# Patient Record
Sex: FEMALE | Race: WHITE | NOT HISPANIC OR LATINO
[De-identification: names, ages, dates, MRNs, and addresses within clinical notes are randomized per-mention and may not be internally consistent; named-entity substitution may affect disease eponyms.]

---

## 2021-01-01 ENCOUNTER — EXTERNAL RECORD (OUTPATIENT)
Dept: HEALTH INFORMATION MANAGEMENT | Facility: OTHER | Age: 66
End: 2021-01-01

## 2021-02-04 ENCOUNTER — IMMUNIZATION (OUTPATIENT)
Dept: LAB | Age: 66
End: 2021-02-04

## 2021-02-04 DIAGNOSIS — Z23 NEED FOR VACCINATION: Primary | ICD-10-CM

## 2021-02-04 PROCEDURE — 0001A COVID 19 PFIZER-BIONTECH: CPT

## 2021-02-04 PROCEDURE — 91300 COVID 19 PFIZER-BIONTECH: CPT

## 2021-03-04 ENCOUNTER — IMMUNIZATION (OUTPATIENT)
Dept: LAB | Age: 66
End: 2021-03-04

## 2021-03-04 DIAGNOSIS — Z23 NEED FOR VACCINATION: Primary | ICD-10-CM

## 2021-03-04 PROCEDURE — 0002A COVID 19 PFIZER-BIONTECH: CPT

## 2021-03-04 PROCEDURE — 91300 COVID 19 PFIZER-BIONTECH: CPT

## 2021-05-08 ENCOUNTER — HOSPITAL ENCOUNTER (INPATIENT)
Facility: HOSPITAL | Age: 66
LOS: 5 days | Discharge: HOME HEALTH | DRG: 481 | End: 2021-05-13
Attending: EMERGENCY MEDICINE | Admitting: HOSPITALIST
Payer: MEDICARE

## 2021-05-08 ENCOUNTER — APPOINTMENT (OUTPATIENT)
Dept: GENERAL RADIOLOGY | Facility: HOSPITAL | Age: 66
DRG: 481 | End: 2021-05-08
Attending: EMERGENCY MEDICINE
Payer: MEDICARE

## 2021-05-08 DIAGNOSIS — S00.81XA ABRASION OF FACE, INITIAL ENCOUNTER: ICD-10-CM

## 2021-05-08 DIAGNOSIS — S72.401A CLOSED FRACTURE OF DISTAL END OF RIGHT FEMUR, UNSPECIFIED FRACTURE MORPHOLOGY, INITIAL ENCOUNTER (HCC): Primary | ICD-10-CM
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
## 2021-05-08 PROCEDURE — 99223 1ST HOSP IP/OBS HIGH 75: CPT | Performed by: INTERNAL MEDICINE

## 2021-05-08 PROCEDURE — 73560 X-RAY EXAM OF KNEE 1 OR 2: CPT | Performed by: EMERGENCY MEDICINE

## 2021-05-08 RX ORDER — MORPHINE SULFATE 4 MG/ML
4 INJECTION, SOLUTION INTRAMUSCULAR; INTRAVENOUS ONCE
Status: COMPLETED | OUTPATIENT
Start: 2021-05-08 | End: 2021-05-08

## 2021-05-08 RX ORDER — HEPARIN SODIUM 5000 [USP'U]/ML
5000 INJECTION, SOLUTION INTRAVENOUS; SUBCUTANEOUS EVERY 8 HOURS SCHEDULED
Status: COMPLETED | OUTPATIENT
Start: 2021-05-08 | End: 2021-05-09

## 2021-05-08 RX ORDER — ACETAMINOPHEN 325 MG/1
650 TABLET ORAL EVERY 4 HOURS PRN
Status: DISCONTINUED | OUTPATIENT
Start: 2021-05-08 | End: 2021-05-13

## 2021-05-08 RX ORDER — SODIUM CHLORIDE, SODIUM LACTATE, POTASSIUM CHLORIDE, CALCIUM CHLORIDE 600; 310; 30; 20 MG/100ML; MG/100ML; MG/100ML; MG/100ML
INJECTION, SOLUTION INTRAVENOUS CONTINUOUS
Status: DISCONTINUED | OUTPATIENT
Start: 2021-05-08 | End: 2021-05-11

## 2021-05-08 RX ORDER — MORPHINE SULFATE 2 MG/ML
2 INJECTION, SOLUTION INTRAMUSCULAR; INTRAVENOUS EVERY 2 HOUR PRN
Status: DISCONTINUED | OUTPATIENT
Start: 2021-05-08 | End: 2021-05-13

## 2021-05-08 RX ORDER — HYDROCODONE BITARTRATE AND ACETAMINOPHEN 5; 325 MG/1; MG/1
1 TABLET ORAL EVERY 4 HOURS PRN
Status: DISCONTINUED | OUTPATIENT
Start: 2021-05-08 | End: 2021-05-13

## 2021-05-08 RX ORDER — HYDROCODONE BITARTRATE AND ACETAMINOPHEN 5; 325 MG/1; MG/1
2 TABLET ORAL EVERY 4 HOURS PRN
Status: DISCONTINUED | OUTPATIENT
Start: 2021-05-08 | End: 2021-05-13

## 2021-05-08 RX ORDER — ONDANSETRON 2 MG/ML
INJECTION INTRAMUSCULAR; INTRAVENOUS
Status: COMPLETED
Start: 2021-05-08 | End: 2021-05-08

## 2021-05-08 RX ORDER — MORPHINE SULFATE 2 MG/ML
1 INJECTION, SOLUTION INTRAMUSCULAR; INTRAVENOUS EVERY 2 HOUR PRN
Status: DISCONTINUED | OUTPATIENT
Start: 2021-05-08 | End: 2021-05-13

## 2021-05-08 RX ORDER — MORPHINE SULFATE 4 MG/ML
4 INJECTION, SOLUTION INTRAMUSCULAR; INTRAVENOUS EVERY 2 HOUR PRN
Status: DISCONTINUED | OUTPATIENT
Start: 2021-05-08 | End: 2021-05-13

## 2021-05-08 RX ORDER — ONDANSETRON 2 MG/ML
4 INJECTION INTRAMUSCULAR; INTRAVENOUS EVERY 6 HOURS PRN
Status: DISCONTINUED | OUTPATIENT
Start: 2021-05-08 | End: 2021-05-13

## 2021-05-08 RX ORDER — METOCLOPRAMIDE HYDROCHLORIDE 5 MG/ML
10 INJECTION INTRAMUSCULAR; INTRAVENOUS EVERY 8 HOURS PRN
Status: DISCONTINUED | OUTPATIENT
Start: 2021-05-08 | End: 2021-05-13

## 2021-05-08 RX ORDER — ONDANSETRON 2 MG/ML
4 INJECTION INTRAMUSCULAR; INTRAVENOUS ONCE
Status: COMPLETED | OUTPATIENT
Start: 2021-05-08 | End: 2021-05-08

## 2021-05-08 RX ORDER — TRIAMTERENE AND HYDROCHLOROTHIAZIDE 37.5; 25 MG/1; MG/1
1 CAPSULE ORAL EVERY MORNING
Status: ON HOLD | COMMUNITY
End: 2021-05-13

## 2021-05-08 NOTE — ED QUICK NOTES
XR delayed d/t patient nausea. Zofran IV provided to patient. Patient marked ready for XR following medication administration.

## 2021-05-08 NOTE — ED QUICK NOTES
Pt reports she had an apple watch on her left wrist when EMS picked her up. Patient arrived to ED with a 20G IV to left wrist, no apple watch arrived with her. This RN called the [facility] to inquire with their medics. No answer.

## 2021-05-08 NOTE — ED QUICK NOTES
Orders for admission, patient is aware of plan and ready to go upstairs. Any questions, please call ED RN Abhilash Maldonado  at extension [phone].    Type of COVID test sent: Rapid  COVID Suspicion level: Low    Titratable drug(s) infusing:none  Rate:na    LOC at time of

## 2021-05-08 NOTE — ED INITIAL ASSESSMENT (HPI)
[place] Abrasion noted to right knee with some swelling. Laceration noted to right lower lip. Abrasion noted to right cheekbone and forehead. Arrived with C Collar in place. Dr. Kathleen Lai at the bedside.  Ccollar removed by MD.

## 2021-05-08 NOTE — ED PROVIDER NOTES
Patient Seen in: [facility] AND [facility] Emergency Department      History   Patient presents with:  Fall    Stated Complaint: fall, knee pain laceration to lip    HPI/Subjective:   HPI    79-year-old female with no significant past medical history here after 05/08/21 1645 Oral   SpO2 05/08/21 1459 94 %   O2 Device 05/08/21 1459 None (Room air)       Current:/55   Pulse 70   Temp 97.5 °F (36.4 °C) (Oral)   Resp 18   Ht 167.6 cm (5' 6\")   Wt 97.5 kg   SpO2 98%   Breastfeeding No   BMI 34.70 kg/m² HGB 14.3 12.0 - 16.0 g/dL    HCT 42.5 35.0 - 48.0 %    MCV 95.1 80.0 - 100.0 fL    MCH 32.0 26.0 - 34.0 pg    MCHC 33.6 31.0 - 37.0 g/dL    RDW-SD 41.1 35.1 - 46.3 fL    RDW 11.9 11.0 - 15.0 %    .0 150.0 - 450.0 10(3)uL    Neutrophil Absolute Preli personally reviewed available prior medical records for any recent pertinent discharge summaries, testing, and procedures, and reviewed those reports. Complicating Factors: The patient already has does not have any pertinent problems on file.  to contrib

## 2021-05-08 NOTE — H&P
[facility]  HISTORY AND PHYSICAL       Geovani Lisa Patient Status:  Inpatient    1955  78year old [facility] [id number]   Location 418/418-A Attending MD YASMINE Steele DO         DATE OF ADMISSION: 21    [facility] right cheek and upper lip  NECK:  Supple. Trach midline  CHEST:  Symmetrical movement on inspiration  HEART:  S1 and S2 heard. RRR   LUNGS:  Air entry was good. No crackles or wheezes   ABDOMEN: Soft and non-tender. Bowel sounds were present.   MUSC results/imaging and discussing plan of care. All questions answered to best of my ability.     **Certification      PHYSICIAN Certification of Need for Inpatient Hospitalization - Initial Certification    Patient will require inpatient services that will re

## 2021-05-09 PROCEDURE — 99233 SBSQ HOSP IP/OBS HIGH 50: CPT | Performed by: HOSPITALIST

## 2021-05-09 NOTE — H&P (VIEW-ONLY)
[facility] - [facility]    Report of Consultation     Oswaldo Patient Status:  Inpatient    1955 MRN [id number]   Location [facility] 4W/SW/SE Attending Darrell Washington MD   Hosp Day # 1 PCP Amee Soto DO     Date of Admission:   Intravenous, Q2H PRN  ondansetron HCl (ZOFRAN) injection 4 mg, 4 mg, Intravenous, Q6H PRN  Metoclopramide HCl (REGLAN) injection 10 mg, 10 mg, Intravenous, Q8H PRN      Triamterene-HCTZ 37.5-25 MG Oral Cap, Take 1 capsule by mouth every morning.         All 05/09/2021     (H) 05/09/2021    CA 8.6 05/09/2021    INR 0.99 05/09/2021    PTP 12.9 05/09/2021         [facility]  [email or web address]       Impression:     Closed fracture of distal end of right femur, unspecified fracture morphology, initial encounter

## 2021-05-09 NOTE — PLAN OF CARE
Pt is ox4. Tolerating liquids and soft food. Denied wanting to eat more than apple sauce and jello. Teds on left leg. Splint on right. IV fluids at 50 ml/hr. EKG done. NPO at midnight. Surgery tomorrow.   Problem: Patient Centered Care  Goal: Patient prefer anxiety  - Utilize distraction and/or relaxation techniques  - Monitor for opioid side effects  - Notify MD/LIP if interventions unsuccessful or patient reports new pain  - Anticipate increased pain with activity and pre-medicate as appropriate  5/9/2021 1 discharge planning if the patient needs post-hospital services based on physician/LIP order or complex needs related to functional status, cognitive ability or social support system  5/9/2021 1610 by Delfino Castillo  Outcome: Progressing

## 2021-05-09 NOTE — PLAN OF CARE
Patient is alert and oriented. RA. Teds to Left lower extremity. Splint to Right lower extremity. NPO at midnight. Fluids infusing. Abrasion/bruising to Right eye and upper lip, abrasion to left knee. Voiding freely, using purewick.  PRN morphine given for from fall injury  Description: INTERVENTIONS:  - Assess pt frequently for physical needs  - Identify cognitive and physical deficits and behaviors that affect risk of falls.   - [place] fall precautions as indicated by assessment.  - Educate pt/family on

## 2021-05-09 NOTE — CONSULTS
[facility]    Report of Consultation    Guzman Garcia Patient Status:  Inpatient    1955 MRN [id number]   Location [facility] 4W/SW/SE Attending Marly Chan MD   Hosp Day # 1 PCP Johnie Clark DO     Date of Admission:   Intravenous, Q2H PRN  ondansetron HCl (ZOFRAN) injection 4 mg, 4 mg, Intravenous, Q6H PRN  Metoclopramide HCl (REGLAN) injection 10 mg, 10 mg, Intravenous, Q8H PRN      Triamterene-HCTZ 37.5-25 MG Oral Cap, Take 1 capsule by mouth every morning.         All 05/09/2021     (H) 05/09/2021    CA 8.6 05/09/2021    INR 0.99 05/09/2021    PTP 12.9 05/09/2021         Imaging:  @[email or web address]@       Impression:     Closed fracture of distal end of right femur, unspecified fracture morphology, initial encounter

## 2021-05-09 NOTE — PROGRESS NOTES
[facility]  Hospitalist Progress Note     Oliver Martin Patient Status:  Inpatient    1955  78year old CSN [id number]   Location 418/418-A Attending Otf Gonzalez MD   Hosp Day # 1 PCP Sandy Shelton DO     Assessment & Plan:   ------- erythema, diaphoresis. Psych: Normal mood and affect.  Calm, cooperative    Labs:  Recent Labs   Lab 05/08/21  1702 05/09/21  0617   RBC 4.47 3.79*   HGB 14.3 12.6   HCT 42.5 36.0   MCV 95.1 95.0   MCH 32.0 33.2   MCHC 33.6 35.0   RDW 11.9 11.9   NEPRELIM

## 2021-05-10 ENCOUNTER — ANESTHESIA EVENT (OUTPATIENT)
Dept: SURGERY | Facility: HOSPITAL | Age: 66
DRG: 481 | End: 2021-05-10
Payer: MEDICARE

## 2021-05-10 ENCOUNTER — APPOINTMENT (OUTPATIENT)
Dept: GENERAL RADIOLOGY | Facility: HOSPITAL | Age: 66
DRG: 481 | End: 2021-05-10
Attending: ORTHOPAEDIC SURGERY
Payer: MEDICARE

## 2021-05-10 ENCOUNTER — ANESTHESIA (OUTPATIENT)
Dept: SURGERY | Facility: HOSPITAL | Age: 66
DRG: 481 | End: 2021-05-10
Payer: MEDICARE

## 2021-05-10 PROCEDURE — 73560 X-RAY EXAM OF KNEE 1 OR 2: CPT | Performed by: ORTHOPAEDIC SURGERY

## 2021-05-10 PROCEDURE — 76000 FLUOROSCOPY <1 HR PHYS/QHP: CPT | Performed by: ORTHOPAEDIC SURGERY

## 2021-05-10 PROCEDURE — 99232 SBSQ HOSP IP/OBS MODERATE 35: CPT | Performed by: HOSPITALIST

## 2021-05-10 PROCEDURE — 0QSB04Z REPOSITION RIGHT LOWER FEMUR WITH INTERNAL FIXATION DEVICE, OPEN APPROACH: ICD-10-PCS | Performed by: ORTHOPAEDIC SURGERY

## 2021-05-10 DEVICE — IMPLANTABLE DEVICE: Type: IMPLANTABLE DEVICE | Site: FEMUR | Status: FUNCTIONAL

## 2021-05-10 RX ORDER — CEFAZOLIN SODIUM/WATER 2 G/20 ML
2 SYRINGE (ML) INTRAVENOUS ONCE
Status: COMPLETED | OUTPATIENT
Start: 2021-05-10 | End: 2021-05-10

## 2021-05-10 RX ORDER — MORPHINE SULFATE 4 MG/ML
4 INJECTION, SOLUTION INTRAMUSCULAR; INTRAVENOUS EVERY 10 MIN PRN
Status: DISCONTINUED | OUTPATIENT
Start: 2021-05-10 | End: 2021-05-10 | Stop reason: HOSPADM

## 2021-05-10 RX ORDER — EPHEDRINE SULFATE 50 MG/ML
INJECTION INTRAVENOUS AS NEEDED
Status: DISCONTINUED | OUTPATIENT
Start: 2021-05-10 | End: 2021-05-10 | Stop reason: SURG

## 2021-05-10 RX ORDER — DIPHENHYDRAMINE HYDROCHLORIDE 50 MG/ML
12.5 INJECTION INTRAMUSCULAR; INTRAVENOUS EVERY 4 HOURS PRN
Status: DISCONTINUED | OUTPATIENT
Start: 2021-05-10 | End: 2021-05-13

## 2021-05-10 RX ORDER — TRANEXAMIC ACID 10 MG/ML
1000 INJECTION, SOLUTION INTRAVENOUS 2 TIMES DAILY
Status: COMPLETED | OUTPATIENT
Start: 2021-05-10 | End: 2021-05-10

## 2021-05-10 RX ORDER — BISACODYL 10 MG
10 SUPPOSITORY, RECTAL RECTAL
Status: DISCONTINUED | OUTPATIENT
Start: 2021-05-10 | End: 2021-05-13

## 2021-05-10 RX ORDER — SCOLOPAMINE TRANSDERMAL SYSTEM 1 MG/1
1 PATCH, EXTENDED RELEASE TRANSDERMAL ONCE
Status: COMPLETED | OUTPATIENT
Start: 2021-05-10 | End: 2021-05-13

## 2021-05-10 RX ORDER — NEOSTIGMINE METHYLSULFATE 1 MG/ML
INJECTION INTRAVENOUS AS NEEDED
Status: DISCONTINUED | OUTPATIENT
Start: 2021-05-10 | End: 2021-05-10 | Stop reason: SURG

## 2021-05-10 RX ORDER — CEFAZOLIN SODIUM/WATER 2 G/20 ML
2 SYRINGE (ML) INTRAVENOUS EVERY 8 HOURS
Status: COMPLETED | OUTPATIENT
Start: 2021-05-10 | End: 2021-05-11

## 2021-05-10 RX ORDER — ONDANSETRON 2 MG/ML
4 INJECTION INTRAMUSCULAR; INTRAVENOUS EVERY 4 HOURS PRN
Status: DISCONTINUED | OUTPATIENT
Start: 2021-05-10 | End: 2021-05-13

## 2021-05-10 RX ORDER — HYDROMORPHONE HYDROCHLORIDE 1 MG/ML
0.6 INJECTION, SOLUTION INTRAMUSCULAR; INTRAVENOUS; SUBCUTANEOUS EVERY 5 MIN PRN
Status: DISCONTINUED | OUTPATIENT
Start: 2021-05-10 | End: 2021-05-10 | Stop reason: HOSPADM

## 2021-05-10 RX ORDER — NALOXONE HYDROCHLORIDE 0.4 MG/ML
80 INJECTION, SOLUTION INTRAMUSCULAR; INTRAVENOUS; SUBCUTANEOUS AS NEEDED
Status: DISCONTINUED | OUTPATIENT
Start: 2021-05-10 | End: 2021-05-10 | Stop reason: HOSPADM

## 2021-05-10 RX ORDER — ONDANSETRON 2 MG/ML
4 INJECTION INTRAMUSCULAR; INTRAVENOUS ONCE AS NEEDED
Status: DISCONTINUED | OUTPATIENT
Start: 2021-05-10 | End: 2021-05-10 | Stop reason: HOSPADM

## 2021-05-10 RX ORDER — PROCHLORPERAZINE EDISYLATE 5 MG/ML
5 INJECTION INTRAMUSCULAR; INTRAVENOUS ONCE AS NEEDED
Status: DISCONTINUED | OUTPATIENT
Start: 2021-05-10 | End: 2021-05-10 | Stop reason: HOSPADM

## 2021-05-10 RX ORDER — SODIUM CHLORIDE 9 MG/ML
INJECTION, SOLUTION INTRAVENOUS CONTINUOUS
Status: DISCONTINUED | OUTPATIENT
Start: 2021-05-10 | End: 2021-05-11

## 2021-05-10 RX ORDER — CYCLOBENZAPRINE HCL 10 MG
10 TABLET ORAL EVERY 8 HOURS PRN
Status: DISCONTINUED | OUTPATIENT
Start: 2021-05-10 | End: 2021-05-13

## 2021-05-10 RX ORDER — VANCOMYCIN HYDROCHLORIDE 1 G/20ML
INJECTION, POWDER, LYOPHILIZED, FOR SOLUTION INTRAVENOUS AS NEEDED
Status: DISCONTINUED | OUTPATIENT
Start: 2021-05-10 | End: 2021-05-10 | Stop reason: HOSPADM

## 2021-05-10 RX ORDER — ASPIRIN 325 MG
325 TABLET ORAL 2 TIMES DAILY
Status: DISCONTINUED | OUTPATIENT
Start: 2021-05-11 | End: 2021-05-13

## 2021-05-10 RX ORDER — SODIUM CHLORIDE, SODIUM LACTATE, POTASSIUM CHLORIDE, CALCIUM CHLORIDE 600; 310; 30; 20 MG/100ML; MG/100ML; MG/100ML; MG/100ML
INJECTION, SOLUTION INTRAVENOUS CONTINUOUS
Status: DISCONTINUED | OUTPATIENT
Start: 2021-05-10 | End: 2021-05-10 | Stop reason: HOSPADM

## 2021-05-10 RX ORDER — DIPHENHYDRAMINE HYDROCHLORIDE 50 MG/ML
25 INJECTION INTRAMUSCULAR; INTRAVENOUS ONCE AS NEEDED
Status: ACTIVE | OUTPATIENT
Start: 2021-05-10 | End: 2021-05-10

## 2021-05-10 RX ORDER — HYDROMORPHONE HYDROCHLORIDE 1 MG/ML
0.4 INJECTION, SOLUTION INTRAMUSCULAR; INTRAVENOUS; SUBCUTANEOUS EVERY 5 MIN PRN
Status: DISCONTINUED | OUTPATIENT
Start: 2021-05-10 | End: 2021-05-10 | Stop reason: HOSPADM

## 2021-05-10 RX ORDER — DOCUSATE SODIUM 100 MG/1
100 CAPSULE, LIQUID FILLED ORAL 2 TIMES DAILY
Status: DISCONTINUED | OUTPATIENT
Start: 2021-05-10 | End: 2021-05-13

## 2021-05-10 RX ORDER — HYDROCODONE BITARTRATE AND ACETAMINOPHEN 5; 325 MG/1; MG/1
2 TABLET ORAL AS NEEDED
Status: DISCONTINUED | OUTPATIENT
Start: 2021-05-10 | End: 2021-05-10 | Stop reason: HOSPADM

## 2021-05-10 RX ORDER — ONDANSETRON 2 MG/ML
INJECTION INTRAMUSCULAR; INTRAVENOUS AS NEEDED
Status: DISCONTINUED | OUTPATIENT
Start: 2021-05-10 | End: 2021-05-10 | Stop reason: SURG

## 2021-05-10 RX ORDER — SENNOSIDES 8.6 MG
17.2 TABLET ORAL NIGHTLY
Status: DISCONTINUED | OUTPATIENT
Start: 2021-05-10 | End: 2021-05-13

## 2021-05-10 RX ORDER — ACETAMINOPHEN 500 MG
1000 TABLET ORAL ONCE
Status: COMPLETED | OUTPATIENT
Start: 2021-05-10 | End: 2021-05-10

## 2021-05-10 RX ORDER — DIPHENHYDRAMINE HCL 25 MG
25 CAPSULE ORAL EVERY 4 HOURS PRN
Status: DISCONTINUED | OUTPATIENT
Start: 2021-05-10 | End: 2021-05-13

## 2021-05-10 RX ORDER — MORPHINE SULFATE 4 MG/ML
2 INJECTION, SOLUTION INTRAMUSCULAR; INTRAVENOUS EVERY 10 MIN PRN
Status: DISCONTINUED | OUTPATIENT
Start: 2021-05-10 | End: 2021-05-10 | Stop reason: HOSPADM

## 2021-05-10 RX ORDER — POLYETHYLENE GLYCOL 3350 17 G/17G
17 POWDER, FOR SOLUTION ORAL DAILY PRN
Status: DISCONTINUED | OUTPATIENT
Start: 2021-05-10 | End: 2021-05-13

## 2021-05-10 RX ORDER — HALOPERIDOL 5 MG/ML
0.25 INJECTION INTRAMUSCULAR ONCE AS NEEDED
Status: DISCONTINUED | OUTPATIENT
Start: 2021-05-10 | End: 2021-05-10 | Stop reason: HOSPADM

## 2021-05-10 RX ORDER — MORPHINE SULFATE 10 MG/ML
6 INJECTION, SOLUTION INTRAMUSCULAR; INTRAVENOUS EVERY 10 MIN PRN
Status: DISCONTINUED | OUTPATIENT
Start: 2021-05-10 | End: 2021-05-10 | Stop reason: HOSPADM

## 2021-05-10 RX ORDER — MIDAZOLAM HYDROCHLORIDE 1 MG/ML
INJECTION INTRAMUSCULAR; INTRAVENOUS AS NEEDED
Status: DISCONTINUED | OUTPATIENT
Start: 2021-05-10 | End: 2021-05-10 | Stop reason: SURG

## 2021-05-10 RX ORDER — HYDROCODONE BITARTRATE AND ACETAMINOPHEN 5; 325 MG/1; MG/1
1 TABLET ORAL AS NEEDED
Status: DISCONTINUED | OUTPATIENT
Start: 2021-05-10 | End: 2021-05-10 | Stop reason: HOSPADM

## 2021-05-10 RX ORDER — HYDROMORPHONE HYDROCHLORIDE 1 MG/ML
0.2 INJECTION, SOLUTION INTRAMUSCULAR; INTRAVENOUS; SUBCUTANEOUS EVERY 5 MIN PRN
Status: DISCONTINUED | OUTPATIENT
Start: 2021-05-10 | End: 2021-05-10 | Stop reason: HOSPADM

## 2021-05-10 RX ORDER — SODIUM PHOSPHATE, DIBASIC AND SODIUM PHOSPHATE, MONOBASIC 7; 19 G/133ML; G/133ML
1 ENEMA RECTAL ONCE AS NEEDED
Status: DISCONTINUED | OUTPATIENT
Start: 2021-05-10 | End: 2021-05-13

## 2021-05-10 RX ORDER — PROCHLORPERAZINE EDISYLATE 5 MG/ML
10 INJECTION INTRAMUSCULAR; INTRAVENOUS EVERY 6 HOURS PRN
Status: DISPENSED | OUTPATIENT
Start: 2021-05-10 | End: 2021-05-12

## 2021-05-10 RX ORDER — LIDOCAINE HYDROCHLORIDE 10 MG/ML
INJECTION, SOLUTION EPIDURAL; INFILTRATION; INTRACAUDAL; PERINEURAL AS NEEDED
Status: DISCONTINUED | OUTPATIENT
Start: 2021-05-10 | End: 2021-05-10 | Stop reason: SURG

## 2021-05-10 RX ORDER — DEXAMETHASONE SODIUM PHOSPHATE 4 MG/ML
VIAL (ML) INJECTION AS NEEDED
Status: DISCONTINUED | OUTPATIENT
Start: 2021-05-10 | End: 2021-05-10 | Stop reason: SURG

## 2021-05-10 RX ORDER — GLYCOPYRROLATE 0.2 MG/ML
INJECTION, SOLUTION INTRAMUSCULAR; INTRAVENOUS AS NEEDED
Status: DISCONTINUED | OUTPATIENT
Start: 2021-05-10 | End: 2021-05-10 | Stop reason: SURG

## 2021-05-10 RX ORDER — ROCURONIUM BROMIDE 10 MG/ML
INJECTION, SOLUTION INTRAVENOUS AS NEEDED
Status: DISCONTINUED | OUTPATIENT
Start: 2021-05-10 | End: 2021-05-10 | Stop reason: SURG

## 2021-05-10 RX ADMIN — TRANEXAMIC ACID 1000 MG: 10 INJECTION, SOLUTION INTRAVENOUS at 15:25:00

## 2021-05-10 RX ADMIN — SODIUM CHLORIDE, SODIUM LACTATE, POTASSIUM CHLORIDE, CALCIUM CHLORIDE: 600; 310; 30; 20 INJECTION, SOLUTION INTRAVENOUS at 16:42:00

## 2021-05-10 RX ADMIN — MIDAZOLAM HYDROCHLORIDE 2 MG: 1 INJECTION INTRAMUSCULAR; INTRAVENOUS at 15:09:00

## 2021-05-10 RX ADMIN — ROCURONIUM BROMIDE 30 MG: 10 INJECTION, SOLUTION INTRAVENOUS at 15:25:00

## 2021-05-10 RX ADMIN — SODIUM CHLORIDE, SODIUM LACTATE, POTASSIUM CHLORIDE, CALCIUM CHLORIDE: 600; 310; 30; 20 INJECTION, SOLUTION INTRAVENOUS at 15:09:00

## 2021-05-10 RX ADMIN — SODIUM CHLORIDE, SODIUM LACTATE, POTASSIUM CHLORIDE, CALCIUM CHLORIDE: 600; 310; 30; 20 INJECTION, SOLUTION INTRAVENOUS at 17:38:00

## 2021-05-10 RX ADMIN — EPHEDRINE SULFATE 5 MG: 50 INJECTION INTRAVENOUS at 16:39:00

## 2021-05-10 RX ADMIN — ROCURONIUM BROMIDE 10 MG: 10 INJECTION, SOLUTION INTRAVENOUS at 15:15:00

## 2021-05-10 RX ADMIN — ONDANSETRON 4 MG: 2 INJECTION INTRAMUSCULAR; INTRAVENOUS at 17:26:00

## 2021-05-10 RX ADMIN — CEFAZOLIN SODIUM/WATER 2 G: 2 G/20 ML SYRINGE (ML) INTRAVENOUS at 15:18:00

## 2021-05-10 RX ADMIN — NEOSTIGMINE METHYLSULFATE 5 MG: 1 INJECTION INTRAVENOUS at 17:07:00

## 2021-05-10 RX ADMIN — LIDOCAINE HYDROCHLORIDE 50 MG: 10 INJECTION, SOLUTION EPIDURAL; INFILTRATION; INTRACAUDAL; PERINEURAL at 15:15:00

## 2021-05-10 RX ADMIN — GLYCOPYRROLATE 0.6 MG: 0.2 INJECTION, SOLUTION INTRAMUSCULAR; INTRAVENOUS at 17:07:00

## 2021-05-10 RX ADMIN — DEXAMETHASONE SODIUM PHOSPHATE 4 MG: 4 MG/ML VIAL (ML) INJECTION at 15:20:00

## 2021-05-10 RX ADMIN — EPHEDRINE SULFATE 5 MG: 50 INJECTION INTRAVENOUS at 16:42:00

## 2021-05-10 RX ADMIN — ROCURONIUM BROMIDE 20 MG: 10 INJECTION, SOLUTION INTRAVENOUS at 15:57:00

## 2021-05-10 NOTE — CONSULTS
[facility]    Report of Consultation    Adriel Hodgkins Patient Status:  Inpatient    1955 MRN [id number]   Location [facility] Attending Manas Guthrie MD   Hosp Day # 2 PCP Mauricio Labs, DO     Date of Admission: Q6H PRN  •  [MAR Hold] Metoclopramide HCl (REGLAN) injection 10 mg, 10 mg, Intravenous, Q8H PRN    Review of Systems:  Pertinent items are noted in HPI. Physical Exam:    General: Alert, orientated x3. Cooperative. No apparent distress.   Vital Signs:

## 2021-05-10 NOTE — INTERVAL H&P NOTE
Pre-op Diagnosis: Closed fracture of right distal femur ([address] [S72.401A]    The above referenced H&P was reviewed by Efraín Arellano MD on 5/10/2021, the patient was examined and no significant changes have occurred in the patient's condition since the H&P was

## 2021-05-10 NOTE — ANESTHESIA PREPROCEDURE EVALUATION
Anesthesia PreOp Note    HPI:     Tess Cardozo is a 78year old female who presents for preoperative consultation requested by: Melia Schuster MD    Date of Surgery: 5/8/2021 - 5/10/2021    Procedure(s):  OPEN REDUCTION INTERNAL FIXATION RIGHT DISTAL FEMUR  Ind tablet, 2 tablet, Oral, Q4H PRN, Olivia Larsen MD, 2 tablet at 05/09/21 2019  morphINE sulfate (PF) 2 MG/ML injection 1 mg, 1 mg, Intravenous, Q2H PRN, Chele Hernandez MD   Or  morphINE sulfate (PF) 2 MG/ML injection 2 mg, 2 mg, Intravenous, Q2H Services:       Active Member of Clubs or Organizations:       Attends Club or Organization Meetings:       Marital Status:   Intimate Partner Violence:       Fear of Current or Ex-Partner:       Emotionally Abused:       Physically Abused:       Sexually and/or legal guardian or family member of the nature of the anesthetic plan, benefits, risks including possible dental damage if relevant, major complications, and any alternative forms of anesthetic management.    All of the patient's questions were answer

## 2021-05-10 NOTE — ANESTHESIA PROCEDURE NOTES
Airway  Date/Time: 5/10/2021 3:17 PM  Urgency: elective    Airway not difficult    General Information and Staff    Patient location during procedure: OR  Anesthesiologist: Jose Luis Gomez MD  Resident/CRNA: Jordan William., CRNA  Performed: CRNA     In

## 2021-05-10 NOTE — ANESTHESIA POSTPROCEDURE EVALUATION
Patient: Jona Kovacs    Procedure Summary     Date: 05/10/21 Room / Location: [facility] MAIN OR 05 / [address] [place] [address] MAIN OR    Anesthesia Start: 2267 Anesthesia Stop: 5096    Procedure: OPEN REDUCTION INTERNAL FIXATION RIGHT DISTAL FEMUR (Right Knee) Diagnosis:       Closed

## 2021-05-10 NOTE — PROGRESS NOTES
[facility]  Hospitalist Progress Note     Inge Gosselin Patient Status:  Inpatient    1955  78year old CSN [id number]   Location 418/418-A Attending Katelyn Ayala MD   Hosp Day # 2 PCP Annelise Harrington DO     Assessment & Plan:   ------- erythema, diaphoresis. Psych: Normal mood and affect.  Calm, cooperative    Labs:  Recent Labs   Lab 05/08/21  1702 05/09/21  0617   RBC 4.47 3.79*   HGB 14.3 12.6   HCT 42.5 36.0   MCV 95.1 95.0   MCH 32.0 33.2   MCHC 33.6 35.0   RDW 11.9 11.9   NEPRELIM

## 2021-05-10 NOTE — OPERATIVE REPORT
PREOPERATIVE DIAGNOSIS: right supracondylar distal femur fracture. POSTOPERATIVE DIAGNOSIS: right supracondylar distal femur fracture. PROCEDURE: Open reduction internal fixation of the right supracondylar, distal femur fracture.     IMPLANTS: We used and cleaned the cortical margins. There were comminuted fracture fragments with no periosteal attachments to the cortical bone. The bone was quite osteoporotic. Next we began the reduction.  Therefore, we reduced the anterior portion of the distal condy therapy.

## 2021-05-10 NOTE — PLAN OF CARE
Patient is alert and oriented. RA. Teds to Left lower extremity, splint in place to right lower extremity. Voiding freely, using purewick. Patient on bedrest. PRN norco given. PRN zofran given for nausea. Heel boots on.  Bruising and abrasion to Right eye a growth factors as ordered  - Implement neutropenic guidelines  Outcome: Progressing     Problem: SAFETY ADULT - FALL  Goal: Free from fall injury  Description: INTERVENTIONS:  - Assess pt frequently for physical needs  - Identify cognitive and physical def

## 2021-05-11 PROCEDURE — 99232 SBSQ HOSP IP/OBS MODERATE 35: CPT | Performed by: HOSPITALIST

## 2021-05-11 RX ORDER — ASPIRIN 325 MG
325 TABLET ORAL 2 TIMES DAILY
Qty: 70 TABLET | Refills: 0 | Status: SHIPPED | OUTPATIENT
Start: 2021-05-11

## 2021-05-11 RX ORDER — SODIUM CHLORIDE 9 MG/ML
INJECTION, SOLUTION INTRAVENOUS CONTINUOUS
Status: DISCONTINUED | OUTPATIENT
Start: 2021-05-11 | End: 2021-05-13

## 2021-05-11 RX ORDER — HYDROCODONE BITARTRATE AND ACETAMINOPHEN 5; 325 MG/1; MG/1
2 TABLET ORAL EVERY 4 HOURS PRN
Qty: 60 TABLET | Refills: 0 | Status: SHIPPED | OUTPATIENT
Start: 2021-05-11

## 2021-05-11 NOTE — CM/SW NOTE
SW received MDO for Surgery. SW met with pt to complete an initial assessment. SW confirmed pt's address and phone number with the pt. Pt lives in a 1 level  condo w/ elevators alone. There is/are 2 steps into the home and 0 steps to the bedrooms.  Pt state

## 2021-05-11 NOTE — PHYSICAL THERAPY NOTE
PHYSICAL THERAPY EVALUATION - INPATIENT     Room Number: 418/418-A  Evaluation Date: 5/11/2021  Type of Evaluation: Initial   Physician Order: PT Eval and Treat    Presenting Problem: ORIF R distal femur with KATHLEEN  Reason for Therapy: Mobility Dysfunction been calculated based on documentation in the [facility] '6 clicks' Inpatient Basic Mobility Short Form. Research supports that patients with this level of impairment may benefit from home with home health.     Patient will benefit from continued IP PT services mechanics;Repositioning    COGNITION  · Overall Cognitive Status:  WFL - within functional limits    RANGE OF MOTION AND STRENGTH ASSESSMENT    Lower extremity ROM is within functional limits LLE WNL    Lower extremity strength is within functional limits with walker - rolling     Goal #2  Current Status    Goal #3 Patient is able to ambulate 30 feet with assist device: walker - rolling at assistance level: supervision   Goal #3   Current Status    Goal #4 Patient will negotiate 2 stairs/one curb w/ assisti

## 2021-05-11 NOTE — PROGRESS NOTES
[address] Patient Status:  Inpatient    1955 MRN [id number]   Location [facility] 4W/SW/SE Attending Marlen Parada MD   [facility] Day # 3 PCP Lele Salazar DO     Subjective:  Post-Operative Day: 1 Status Post right ORIF of (COMPAZINE) injection 10 mg, 10 mg, Intravenous, Q6H PRN  diphenhydrAMINE (BENADRYL) cap/tab 25 mg, 25 mg, Oral, Q4H PRN   Or  diphenhydrAMINE HCl (BENADRYL) IV PUSH injection 12.5 mg, 12.5 mg, Intravenous, Q4H PRN  aspirin tab 325 mg, 325 mg, Oral, BID  l

## 2021-05-11 NOTE — OCCUPATIONAL THERAPY NOTE
OCCUPATIONAL THERAPY EVALUATION - INPATIENT      Room Number: 418/418-A  Evaluation Date: 5/11/2021  Type of Evaluation: Initial  Presenting Problem:  (RT distal femur fx following a fall, s/p ORIF)    Physician Order: IP Consult to Occupational Therapy  R shoehorn;Long-handled sponge;Raised toilet seat;Transfer tub bench    PLAN  OT Treatment Plan: ADL training;Functional transfer training;Patient/Family training;Equipment eval/education; Compensatory technique education       OCCUPATIONAL THERAPY MEDICAL/SO currently need…  -   Putting on and taking off regular lower body clothing?: A Lot  -   Bathing (including washing, rinsing, drying)?: A Little  -   Toileting, which includes using toilet, bedpan or urinal? : A Little  -   Putting on and taking off regular

## 2021-05-11 NOTE — PROGRESS NOTES
[facility]  Hospitalist Progress Note     Femi Peguero Patient Status:  Inpatient    1955  78year old [facility] [id number]   Location 418/418-A Attending Leo Alan MD   Hosp Day # 3 PCP Martínez Samuels, DO     Assessment & Plan:   ------- 26.5*   MCV 95.1 95.0  --    MCH 32.0 33.2  --    MCHC 33.6 35.0  --    RDW 11.9 11.9  --    NEPRELIM 12.19* 7.14  --    WBC 14.8* 9.7  --    .0 214.0  --      Recent Labs   Lab 05/08/21  1706 05/09/21  0617   * 131*   BUN 23* 23*   WENDY

## 2021-05-11 NOTE — PLAN OF CARE
Pt reports nausea and slight headache. No emesis. Given zofran. Gave IV morphine to manage pain due to pt's nausea. Purewick in place and suctioning. Call light within reach. Safety precautions in place. Pt will need rehab on discharge.        Problem: Sowmya opioid side effects  - Notify MD/LIP if interventions unsuccessful or patient reports new pain  - Anticipate increased pain with activity and pre-medicate as appropriate  Outcome: Progressing     Problem: RISK FOR INFECTION - ADULT  Goal: Absence of fever/ system  Outcome: Progressing

## 2021-05-11 NOTE — PLAN OF CARE
Patient is alert and oriented X3. Check void, IV fluids continued. VS WNL, on room air. Patient is TTWB, PT/OT to eval. Pain controlled, will continue to monitor. Dressing is CDI. Ice pack to surgical site. GARY's and SCD's. Call light is within reach.  Plan and/or relaxation techniques  - Monitor for opioid side effects  - Notify MD/LIP if interventions unsuccessful or patient reports new pain  - Anticipate increased pain with activity and pre-medicate as appropriate  Outcome: Progressing     Problem: RISK FO cognitive ability or social support system  Outcome: Progressing

## 2021-05-12 ENCOUNTER — APPOINTMENT (OUTPATIENT)
Dept: CV DIAGNOSTICS | Facility: HOSPITAL | Age: 66
DRG: 481 | End: 2021-05-12
Attending: NURSE PRACTITIONER
Payer: MEDICARE

## 2021-05-12 PROCEDURE — 93306 TTE W/DOPPLER COMPLETE: CPT | Performed by: NURSE PRACTITIONER

## 2021-05-12 PROCEDURE — 99291 CRITICAL CARE FIRST HOUR: CPT | Performed by: HOSPITALIST

## 2021-05-12 RX ORDER — METOPROLOL TARTRATE 50 MG/1
50 TABLET, FILM COATED ORAL
Status: DISCONTINUED | OUTPATIENT
Start: 2021-05-12 | End: 2021-05-13

## 2021-05-12 RX ORDER — METOPROLOL TARTRATE 5 MG/5ML
INJECTION INTRAVENOUS
Status: DISPENSED
Start: 2021-05-12 | End: 2021-05-12

## 2021-05-12 RX ORDER — METOPROLOL TARTRATE 5 MG/5ML
2.5 INJECTION INTRAVENOUS ONCE
Status: COMPLETED | OUTPATIENT
Start: 2021-05-12 | End: 2021-05-12

## 2021-05-12 NOTE — CONSULTS
[facility] HOSP - [facility]    Cardiology Consultation  [facility]    Love Restrepo Patient Status:  Inpatient    1955 MRN [id number]   Location [facility] 4W/SW/SE Attending Sandra Bryan MD   Hosp Day # 4 PCP Keith Atkinson Oral, Daily PRN  bisacodyl (DULCOLAX) rectal suppository 10 mg, 10 mg, Rectal, Daily PRN  Fleet Enema (FLEET) 7-19 GM/118ML enema 133 mL, 1 enema, Rectal, Once PRN  ondansetron HCl (ZOFRAN) injection 4 mg, 4 mg, Intravenous, Q4H PRN  Prochlorperazine Robert Sánchez 05/10/21 0700 - 05/11/21 0659 05/11/21 0700 - 05/12/21 0659 05/12/21 0700 - 05/13/21 0659       Intake    P.O.  10  700  --    P.O. 10 700 --    I.V.  2940  1750  --    I.V. 1440 1250 --    Volume (mL) (lactated ringers infusion) 1500 -- --    Volume (mL) CA 7.8 (L) 05/12/2021    ALB 2.3 (L) 05/12/2021    INR 0.99 05/09/2021    PTP 12.9 05/09/2021    TSH 1.190 05/12/2021    MG 2.2 05/12/2021    PHOS 1.5 (L) 05/12/2021    TROP <0.045 05/12/2021         Recent Labs   Lab 05/08/21  1706 05/09/21  0617 05/12

## 2021-05-12 NOTE — OCCUPATIONAL THERAPY NOTE
Chart reviewed and attempted OT treatment - patient with RRT this morning for tachycardia in 160's-170's. Will follow up 5/13.

## 2021-05-12 NOTE — SIGNIFICANT EVENT
[facility]    Progress Note    Katerina Chawla Patient Status:  Inpatient    1955 MRN [id number]   Location [facility] 4W/SW/SE Attending Sarah Bonner MD   Hosp Day # 4 PCP Ori Pickens DO       Subjective:     Responded Bony  -Pain control  -ASA for DVT prophylaxis per ortho  -PT/OT  -TTWB     Acute blood loss anemia, anticipated for type of fracture and this surgery. Hgb down to 8.2 (12.6)  -monitor     Leukocytosis. Without fever. Now normal, likely reactive.   -Expec

## 2021-05-12 NOTE — PLAN OF CARE
RA. Afebrile. Afib RVR on tele during RRT on 4th floor and transferred down to CV. Patient remains in Afib on tele, HR 80-90s at rest and up to the 140s with ambulation. PO metoprolol started, patient denies any palpitations.  Echo complete, awaiting result influences on pain and pain management  - Manage/alleviate anxiety  - Utilize distraction and/or relaxation techniques  - Monitor for opioid side effects  - Notify MD/LIP if interventions unsuccessful or patient reports new pain  - Anticipate increased bertin post-hospital services based on physician/LIP order or complex needs related to functional status, cognitive ability or social support system  Outcome: Progressing

## 2021-05-12 NOTE — PLAN OF CARE
POD#2 Pain managed with Norco prn. KATHLEEN dressing with scant amount of bloody drainage. Some bruising noted on right thigh. CMS intack. OOB with assist using walker. TTWB. Fall precautions in place. Call light in reach & bed alarm on.  Plan to discharge home relaxation techniques  - Monitor for opioid side effects  - Notify MD/LIP if interventions unsuccessful or patient reports new pain  - Anticipate increased pain with activity and pre-medicate as appropriate  Outcome: Progressing     Problem: RISK FOR INFEC cognitive ability or social support system  Outcome: Progressing

## 2021-05-12 NOTE — PROGRESS NOTES
RRT    *See RRT Documentation Record*    Reason the RRT was called:  Tachycardia 160-170's  Assessment of patient leading up to RRT: Patient stable HR 65, asymptomatic  Interventions/Testing: EKG, blood work, and IV metoprolol  Patient Outcome/Disposition:

## 2021-05-12 NOTE — PHYSICAL THERAPY NOTE
Attempted to see patient for physical therapy session. Patient had RRT this morning due to high heart rate. Is going to transfer to 3rd floor sometime today. Will attempt to see patient tomorrow for physical therapy session. RN aware and agreeable.

## 2021-05-13 VITALS
OXYGEN SATURATION: 98 % | WEIGHT: 243.5 LBS | BODY MASS INDEX: 39.13 KG/M2 | SYSTOLIC BLOOD PRESSURE: 118 MMHG | HEART RATE: 76 BPM | DIASTOLIC BLOOD PRESSURE: 61 MMHG | TEMPERATURE: 99 F | HEIGHT: 66 IN | RESPIRATION RATE: 20 BRPM

## 2021-05-13 PROCEDURE — 99239 HOSP IP/OBS DSCHRG MGMT >30: CPT | Performed by: HOSPITALIST

## 2021-05-13 RX ORDER — POLYETHYLENE GLYCOL 3350 17 G/17G
17 POWDER, FOR SOLUTION ORAL DAILY PRN
Refills: 0 | Status: SHIPPED | COMMUNITY
Start: 2021-05-13

## 2021-05-13 RX ORDER — HYDROCHLOROTHIAZIDE 12.5 MG/1
12.5 CAPSULE, GELATIN COATED ORAL DAILY
Qty: 30 CAPSULE | Refills: 3 | Status: SHIPPED | OUTPATIENT
Start: 2021-05-13

## 2021-05-13 RX ORDER — METOPROLOL TARTRATE 50 MG/1
50 TABLET, FILM COATED ORAL
Qty: 60 TABLET | Refills: 5 | Status: SHIPPED | OUTPATIENT
Start: 2021-05-13

## 2021-05-13 NOTE — CM/SW NOTE
Received notice from pt's RN/Tatyana, LUIS Roblero, and PT Reanna Hernandez that pt will need [facility] w/ lift assist home. Per RN, pt is cleared for DC. LUIS messaged [facility] via [facility] to notify of pt's DC today.  HH instructions added to pt's AVS.    LUIS spoke to Bre Adrian

## 2021-05-13 NOTE — DISCHARGE PLANNING
Patient was provided with discharge instructions, education, and follow up information. Printed prescriptions for hydrocodone and aspirin were given to patient; all other prescriptions were already sent electronically to patient's pharmacy.  Patient Feng Carrillo

## 2021-05-13 NOTE — PROGRESS NOTES
[facility]    Cardiology Progress Note  [facility]    Filippo Wilkes Patient Status:  Inpatient    1955 MRN [id number]   Location [facility] 3W/SW Attending Ga Garcia MD   Hosp Day # 5 PCP Vaishali Brewster CA 7.8 (L) 05/12/2021    ALB 2.3 (L) 05/12/2021    INR 0.99 05/09/2021    TSH 1.190 05/12/2021    MG 2.2 05/12/2021    PHOS 1.5 (L) 05/12/2021    TROP <0.045 05/12/2021       Recent Labs   Lab 05/08/21  1706 05/09/21  0617 05/12/21  0848   * 131* 18 Date: 5/12/2021  ECG Report  Interpretation  -------------------------- Atrial fibrillation -ST depression + Diffuse nonspecific T-abnormality -Nondiagnostic.  ABNORMAL When compared with ECG of 05/09/2021 10:33:51 Atrial fibrillation has replaced sinus rhy available for comparison. ------------------------------------------------------------------- Study data:  No prior study was available for comparison. Study status:  Elective. Procedure:  Transthoracic echocardiography.  The study was technically limited 2.11cm^2/m^2. Valve area by continuity equation (using LVOT flow): 1.2cm^2. Mean gradient (D): 7mm Hg. Peak gradient (D): 22mm Hg. Left atrium:  The atrium was normal in size. Atrial septum:  The septum was normal. Right ventricle:   The cavity size was - 0.9   LVOT                                      Value          Reference  LVOT ID, S                                1.9   cm       ---------  Stroke volume (SV), LVOT DP               59    ml       ---------  Stroke index (SV/bsa), LVOT DP            25 gradient             25    mm Hg    ---------   Systemic veins                            Value          Reference  Estimated CVP                             8     mm Hg    ---------   Right ventricle                           Value          Reference  RV

## 2021-05-13 NOTE — PHYSICAL THERAPY NOTE
PHYSICAL THERAPY TREATMENT NOTE - INPATIENT     Room Number: 326/326-A       Presenting Problem: ORIF R distal femur with KATHLEEN    Problem List  Principal Problem:    Closed fracture of distal end of right femur, unspecified fracture morphology, initial enc to from stand this session , discussed importance of slow descent in chair to protect right hip . Therapy also reinforced sequencing for compliance to right TTWB Status.        Gait Assistance: Contact guard assist  Distance (ft): 10 ft x 1 15 ft x 2   Assi will be there . Patient was left in bedside chair at end of session with all needs in reach. The patient's Approx Degree of Impairment: 54.16% has been calculated based on documentation in the [facility] '6 clicks' Inpatient Basic Mobility Short Form.   Res Sitting: Good           Static Standing: Fair +  Dynamic Standing: Fair -    ACTIVITY TOLERANCE      resting /44 hR 78 O2 sats 100 %   After activity /75 HR 92 O2 sats 96 %       [facility] '6-Clicks' INPATIENT SHORT FORM - BASIC MOBILITY  How much transport home see above    Goal #5 Patient to demonstrate independence with home activity/exercise instructions provided to patient in preparation for discharge.    Goal #5   Current Status  B AP only primary focus on fxn mobility and pt education , HH PT

## 2021-05-13 NOTE — PLAN OF CARE
Pt. slept throughout most of the shift. No complaints of pain throughout the night. Right leg dressing dry and intact. IV fluids continued. Patient converted to NSR at 0206. HR 80s. No complaints at this time. Will continue to monitor.  Plan to discharge ho relaxation techniques  - Monitor for opioid side effects  - Notify MD/LIP if interventions unsuccessful or patient reports new pain  - Anticipate increased pain with activity and pre-medicate as appropriate  Outcome: Progressing     Problem: SAFETY ADULT -

## 2021-05-13 NOTE — DISCHARGE SUMMARY
[facility] HOSPITALIST  DISCHARGE SUMMARY     Brynelke Acunaic Patient Status:  Inpatient    1955 MRN [id number]   Location [facility] 3W/SW Attending No att. providers found   Hosp Day # 5 PCP Truong Michael DO     Date of Admission: 2021  Date consulted, seen by Dr. Lexx Cintron  -Transferred to cardiac telemetry  -Echo reassuring, EF 55-60%, no regional wall motion abnormalities, only mild aortic stenosis  -[id number] = 2 (female, HTN); no indication for anticoagulation at this time for post-op afib needed (Constipation).    Refills: 0        STOP taking these medications    Triamterene-HCTZ 37.5-25 MG Caps  Commonly known as: DYAZIDE              Where to Get Your Medications      These medications were sent to [address] [place], [place] - fall    Lace+ Score: 52  59-90 High Risk  29-58 Medium Risk  0-28   Low Risk. TCM Follow-Up Recommendation:  LACE 29-58:  Moderate Risk of readmission after discharge from the hospital.        Keren Shea MD 5/13/2021    Time spent:  > 30 minutes

## 2021-05-13 NOTE — PLAN OF CARE
RA. Afebrile. R upper leg dressing c/d/i without any new drainage. Toe touch weight bearing maintained. Pain meds prn. Plan for home today.     Problem: Patient Centered Care  Goal: Patient preferences are identified and integrated in the patient's plan of Anticipate increased pain with activity and pre-medicate as appropriate  Outcome: Progressing     Problem: RISK FOR INFECTION - ADULT  Goal: Absence of fever/infection during anticipated neutropenic period  Description: INTERVENTIONS  - Monitor WBC  - Admi

## (undated) DEVICE — PROXIMATE SKIN STAPLERS (35 WIDE) CONTAINS 35 STAINLESS STEEL STAPLES (FIXED HEAD): Brand: PROXIMATE

## (undated) DEVICE — COVER SGL STRL LGHT HNDL BLU

## (undated) DEVICE — GAMMEX® PI HYBRID SIZE 6.5, STERILE POWDER-FREE SURGICAL GLOVE, POLYISOPRENE AND NEOPRENE BLEND: Brand: GAMMEX

## (undated) DEVICE — DRAPE SHEET LG

## (undated) DEVICE — GAUZE SPONGES,12 PLY: Brand: CURITY

## (undated) DEVICE — GOWN SRG XL IMPRV BRTHBL STRL

## (undated) DEVICE — Device

## (undated) DEVICE — 2DE14 2-0 PDO 24 X 24: Brand: 2DE14 2-0 PDO 24 X 24

## (undated) DEVICE — 3M™ IOBAN™ 2 ANTIMICROBIAL INCISE DRAPE 6650EZ: Brand: IOBAN™ 2

## (undated) DEVICE — ENCORE® LATEX ACCLAIM SIZE 8, STERILE LATEX POWDER-FREE SURGICAL GLOVE: Brand: ENCORE

## (undated) DEVICE — REM POLYHESIVE ADULT PATIENT RETURN ELECTRODE: Brand: VALLEYLAB

## (undated) DEVICE — 2T11 #2 PDO 36 X 36: Brand: 2T11 #2 PDO 36 X 36

## (undated) DEVICE — CHLORAPREP 26ML APPLICATOR

## (undated) DEVICE — OCCLUSIVE GAUZE STRIP,3% BISMUTH TRIBROMOPHENATE IN PETROLATUM BLEND: Brand: XEROFORM

## (undated) DEVICE — 450 ML BOTTLE OF 0.05% CHLORHEXIDINE GLUCONATE IN 99.95% STERILE WATER FOR IRRIGATION, USP AND APPLICATOR.: Brand: IRRISEPT ANTIMICROBIAL WOUND LAVAGE

## (undated) DEVICE — PICO 7 10CM X 40CM: Brand: PICO™ 7

## (undated) DEVICE — SUTURE VICRYL 0 CP-1

## (undated) DEVICE — SUTURE VICRYL 2-0 FS-1

## (undated) DEVICE — DRILL BIT SYNT 3.2X145 310.31

## (undated) DEVICE — SUTURE PDS II 2-0 CP

## (undated) DEVICE — HIP PINNING: Brand: MEDLINE INDUSTRIES, INC.

## (undated) DEVICE — DRAPE SRG 70X60IN SPLT U IMPRV

## (undated) DEVICE — WEBRIL COTTON UNDERCAST PADDING: Brand: WEBRIL

## (undated) DEVICE — SOL  .9 1000ML BTL

## (undated) DEVICE — DRILL BIT SYNT 4.3QC 310.431

## (undated) DEVICE — DRAPE SHEET LAPAROTOMY

## (undated) NOTE — IP AVS SNAPSHOT
[facility]            (For Outpatient Use Only) Initial Admit Date: 5/8/2021   Inpt/Obs Admit Date: Inpt: 5/8/21 / Obs: N/A   Discharge Date:    Rohit Gracia:  [de-identified]   MRN: [de-identified]   CSN: [id number]   CEID: [id number]        [employer]   Payor:  Plan:    Group Number:  Insurance Type:    Subscriber Name:  Subscriber :    Subscriber ID:  Pt Rel to Subscriber:    Hospital Account Financial Class: Medicare    May 13, 2021

## (undated) NOTE — LETTER
Alcides Alvarenga [address], [facility], [facility]  [address]  INFORMED CONSENT FOR TRANSFUSION OF BLOOD OR BLOOD PRODUCTS  My physician has informed me of the nature, purpose, benefits and risks of transfusion for blood and blood components that ______________________________________________  (Signature of Patient)                                                            (Responsible party in case of Minor,

## (undated) NOTE — LETTER
[address] [place] [address]      Authorization for Surgical Operation and Procedure     Date:___________                                                                                                         Time:_______ potential risks that can occur: fever and allergic reactions, hemolytic reactions, transmission of diseases such as Hepatitis, AIDS and Cytomegalovirus (CMV) and fluid overload.   In the event that I wish to have an autologous transfusion of my own blood, o will determine when the applicable recovery period ends for purposes of reinstating the DNAR order.   10. Patients having a sterilization procedure: I understand that if the procedure is successful the results will be permanent and it will therefore be impo _______________________________________________________________ _____________________________  [facility]                                                                                         (Date)                                   (Time)

## (undated) NOTE — LETTER
[facility]  Administration of Anesthesia  1. Elias Cabezas, or _________________________________ acting on her behalf, (Patient) (Dependent/Representative) request to receive anesthesia for my pending procedure/operation/treatment.   A physi bleeding, seizure, cardiac arrest and death. 7. AWARENESS: I understand that it is possible (but unlikely) to have explicit memory of events from the operating room while under general anesthesia.   8. ELECTROCONVULSIVE THERAPY PATIENTS: This consent serve below affirms that prior to the time of the procedure, I have explained to the patient and/or his/her guardian, the risks and benefits of undergoing anesthesia, as well as any reasonable alternatives.     ___________________________________________________